# Patient Record
Sex: MALE | Race: WHITE
[De-identification: names, ages, dates, MRNs, and addresses within clinical notes are randomized per-mention and may not be internally consistent; named-entity substitution may affect disease eponyms.]

---

## 2018-04-23 ENCOUNTER — HOSPITAL ENCOUNTER (EMERGENCY)
Dept: HOSPITAL 80 - FED | Age: 33
LOS: 1 days | Discharge: PSYCHIATRIC OR SUBSTANCE USE FACILITY | End: 2018-04-24
Payer: COMMERCIAL

## 2018-04-23 VITALS — DIASTOLIC BLOOD PRESSURE: 93 MMHG | SYSTOLIC BLOOD PRESSURE: 123 MMHG

## 2018-04-23 DIAGNOSIS — R45.851: Primary | ICD-10-CM

## 2018-04-23 LAB — PLATELET # BLD: 283 10^3/UL (ref 150–400)

## 2018-04-23 PROCEDURE — G0480 DRUG TEST DEF 1-7 CLASSES: HCPCS

## 2018-04-23 NOTE — EDPHY
H & P


Time Seen by Provider: 04/23/18 16:58


HPI/ROS: 





CHIEF COMPLAINT:  Depression, suicidal ideation





HISTORY OF PRESENT ILLNESS:  33-year-old male presents to the emergency 

department with suicidal ideation.  The patient has been feeling depressed.  He 

has no history of mental illness.  He does however have a history of attempted 

hanging in January of 2018. The patient would not disclose to me why he has 

been under a great deal of stress.  He was brought in voluntarily with a co-

worker and).  He denies alcohol or other substance abuse.  Denies homicidal 

ideation.  Denies auditory visual hallucinations.  Patient currently has no 

physical complaints.  The patient does tell me that he continues to feel 

suicidal.  





REVIEW OF SYSTEMS:


Constitutional:  No fever, no chills.


Eyes:  No double or blurry vision.


ENT:  No sore throat.


Respiratory:  No cough, no shortness of breath.


Cardiac:  No chest pain.


Gastrointestinal:  No abdominal pain, vomiting or diarrhea.


Genitourinary:  No dysuria.


Musculoskeletal:  No neck or back pain.


Skin:  No rashes.


Neurological:  No headache.


Past Medical/Surgical History: 





Previous suicide attempt by attempted hanging January 2018


Social History: 





, teacher at [employer]


Smoking Status: Never smoked


Physical Exam: 





General Appearance:  Alert, no distress.  Cooperative.  No signs of trauma to 

his head.  Vital signs are stable.


Eyes:  Pupils equal and round.  Extraocular motions are all intact.


ENT:  Mouth:  Mucous membranes moist.


Respiratory:  No wheezing, rhonchi, or rales, lungs are clear to auscultation.


Cardiovascular:  Regular rate and rhythm.


Gastrointestinal:  Abdomen is soft and nontender, no masses, no rebound or 

guarding, bowel sounds normal.


Neurological:  Alert and oriented x 3, cranial nerves II through XII grossly 

intact


Skin:  Warm and dry, no rashes.


Musculoskeletal:  Nontender to palpate along the cervical, thoracic or lumbar 

spine.  Neck is supple.


Extremities:  Full range of motion and no peripheral edema.


Psychiatric:  Patient is oriented X 3, there is no agitation.


Constitutional: 


 Initial Vital Signs











Temperature (C)  36.6 C   04/23/18 16:44


 


Heart Rate  99   04/23/18 16:44


 


Respiratory Rate  16   04/23/18 16:44


 


Blood Pressure  137/87 H  04/23/18 16:44


 


O2 Sat (%)  99   04/23/18 16:44








 











O2 Delivery Mode               Room Air














Allergies/Adverse Reactions: 


 





No Known Allergies Allergy (Unverified 04/23/18 16:50)


 








Home Medications: 














 Medication  Instructions  Recorded


 


Lorazepam  04/23/18


 


Propranolol HCl  04/23/18














Medical Decision Making


ED Course/Re-evaluation: 





Patient presents feeling depressed and suicidal.  He will not disclose his 

plan.  He was placed on an M1 hold given his continued suicidal ideation and 

history of previous suicide attempt by hanging in January 2018.





Patient has been medically cleared.  He was evaluated by mental health.  He was 

kept on an M1 hold.  He will be placed in inpatient psychiatric unit.  





Patient has been accepted to [facility] by Dr. Coulter.  [facility] paperwork 

done.








Differential Diagnosis: 





Depression including functional and major depression, situational depression, 

medication side effect, drugs and alcohol abuse.





- Data Points


Laboratory Results: 


 Laboratory Results





 04/23/18 17:25 





 04/23/18 17:25 





 











  04/23/18 04/23/18 04/23/18





  17:25 17:25 17:25


 


WBC      9.24 10^3/uL 10^3/uL





     (3.80-9.50) 


 


RBC      5.93 10^6/uL 10^6/uL





     (4.40-6.38) 


 


Hgb      17.6 g/dL H g/dL





     (13.7-17.5) 


 


Hct      50.4 % %





     (40.0-51.0) 


 


MCV      85.0 fL fL





     (81.5-99.8) 


 


MCH      29.7 pg pg





     (27.9-34.1) 


 


MCHC      34.9 g/dL g/dL





     (32.4-36.7) 


 


RDW      12.0 % %





     (11.5-15.2) 


 


Plt Count      283 10^3/uL 10^3/uL





     (150-400) 


 


MPV      9.5 fL fL





     (8.7-11.7) 


 


Neut % (Auto)      63.2 % %





     (39.3-74.2) 


 


Lymph % (Auto)      29.0 % %





     (15.0-45.0) 


 


Mono % (Auto)      6.2 % %





     (4.5-13.0) 


 


Eos % (Auto)      0.9 % %





     (0.6-7.6) 


 


Baso % (Auto)      0.5 % %





     (0.3-1.7) 


 


Nucleat RBC Rel Count      0.0 % %





     (0.0-0.2) 


 


Absolute Neuts (auto)      5.84 10^3/uL 10^3/uL





     (1.70-6.50) 


 


Absolute Lymphs (auto)      2.68 10^3/uL 10^3/uL





     (1.00-3.00) 


 


Absolute Monos (auto)      0.57 10^3/uL 10^3/uL





     (0.30-0.80) 


 


Absolute Eos (auto)      0.08 10^3/uL 10^3/uL





     (0.03-0.40) 


 


Absolute Basos (auto)      0.05 10^3/uL 10^3/uL





     (0.02-0.10) 


 


Absolute Nucleated RBC      0.00 10^3/uL 10^3/uL





     (0-0.01) 


 


Immature Gran %      0.2 % %





     (0.0-1.1) 


 


Immature Gran #      0.02 10^3/uL 10^3/uL





     (0.00-0.10) 


 


Sodium    143 mEq/L mEq/L  





    (135-145)  


 


Potassium    4.2 mEq/L mEq/L  





    (3.5-5.2)  


 


Chloride    105 mEq/L mEq/L  





    ()  


 


Carbon Dioxide    23 mEq/l mEq/l  





    (22-31)  


 


Anion Gap    15 mEq/L mEq/L  





    (8-16)  


 


BUN    12 mg/dL mg/dL  





    (7-23)  


 


Creatinine    0.8 mg/dL mg/dL  





    (0.7-1.3)  


 


Estimated GFR    > 60   





    


 


Glucose    86 mg/dL mg/dL  





    ()  


 


Calcium    10.2 mg/dL mg/dL  





    (8.5-10.4)  


 


TSH    1.180 uIU/mL uIU/mL  





    (0.465-4.680)  


 


Urine Opiates Screen  NEGATIVE     





   (NEGATIVE)   


 


Urine Barbiturates  NEGATIVE     





   (NEGATIVE)   


 


Ur Phencyclidine Scrn  NEGATIVE     





   (NEGATIVE)   


 


Ur Amphetamine Screen  NEGATIVE     





   (NEGATIVE)   


 


U Benzodiazepines Scrn  NON-NEGATIVE  H     





   (NEGATIVE)   


 


Urine Cocaine Screen  NEGATIVE     





   (NEGATIVE)   


 


U Marijuana (THC) Screen  NEGATIVE     





   (NEGATIVE)   


 


Ethyl Alcohol    < 10 mg/dL mg/dL  





    (0-10)  














Departure





- Departure


Disposition: Other Psych, Not [facility]


Clinical Impression: 


 Suicidal ideation





Condition: Good


Referrals: 


SHAVON AMADOR [Other] - As per Instructions